# Patient Record
Sex: MALE | Race: ASIAN | NOT HISPANIC OR LATINO | URBAN - METROPOLITAN AREA
[De-identification: names, ages, dates, MRNs, and addresses within clinical notes are randomized per-mention and may not be internally consistent; named-entity substitution may affect disease eponyms.]

---

## 2022-07-05 ENCOUNTER — EMERGENCY (EMERGENCY)
Facility: HOSPITAL | Age: 57
LOS: 1 days | Discharge: HOME | End: 2022-07-05
Attending: EMERGENCY MEDICINE | Admitting: EMERGENCY MEDICINE

## 2022-07-05 VITALS
SYSTOLIC BLOOD PRESSURE: 102 MMHG | OXYGEN SATURATION: 100 % | RESPIRATION RATE: 18 BRPM | TEMPERATURE: 98 F | HEART RATE: 76 BPM | DIASTOLIC BLOOD PRESSURE: 52 MMHG

## 2022-07-05 VITALS
OXYGEN SATURATION: 100 % | RESPIRATION RATE: 18 BRPM | SYSTOLIC BLOOD PRESSURE: 100 MMHG | DIASTOLIC BLOOD PRESSURE: 60 MMHG | HEART RATE: 72 BPM | TEMPERATURE: 98 F

## 2022-07-05 LAB
ALBUMIN SERPL ELPH-MCNC: 3.9 G/DL — SIGNIFICANT CHANGE UP (ref 3.3–5)
ALP SERPL-CCNC: 56 U/L — SIGNIFICANT CHANGE UP (ref 40–120)
ALT FLD-CCNC: 21 U/L — SIGNIFICANT CHANGE UP (ref 4–41)
ANION GAP SERPL CALC-SCNC: 9 MMOL/L — SIGNIFICANT CHANGE UP (ref 7–14)
AST SERPL-CCNC: 19 U/L — SIGNIFICANT CHANGE UP (ref 4–40)
BASOPHILS # BLD AUTO: 0.01 K/UL — SIGNIFICANT CHANGE UP (ref 0–0.2)
BASOPHILS NFR BLD AUTO: 0.1 % — SIGNIFICANT CHANGE UP (ref 0–2)
BILIRUB SERPL-MCNC: 1.4 MG/DL — HIGH (ref 0.2–1.2)
BUN SERPL-MCNC: 14 MG/DL — SIGNIFICANT CHANGE UP (ref 7–23)
CALCIUM SERPL-MCNC: 9 MG/DL — SIGNIFICANT CHANGE UP (ref 8.4–10.5)
CHLORIDE SERPL-SCNC: 105 MMOL/L — SIGNIFICANT CHANGE UP (ref 98–107)
CO2 SERPL-SCNC: 24 MMOL/L — SIGNIFICANT CHANGE UP (ref 22–31)
CREAT SERPL-MCNC: 0.75 MG/DL — SIGNIFICANT CHANGE UP (ref 0.5–1.3)
EGFR: 104 ML/MIN/1.73M2 — SIGNIFICANT CHANGE UP
EOSINOPHIL # BLD AUTO: 0.02 K/UL — SIGNIFICANT CHANGE UP (ref 0–0.5)
EOSINOPHIL NFR BLD AUTO: 0.2 % — SIGNIFICANT CHANGE UP (ref 0–6)
GLUCOSE SERPL-MCNC: 115 MG/DL — HIGH (ref 70–99)
HCT VFR BLD CALC: 38.8 % — LOW (ref 39–50)
HGB BLD-MCNC: 13.6 G/DL — SIGNIFICANT CHANGE UP (ref 13–17)
IANC: 9.39 K/UL — HIGH (ref 1.8–7.4)
IMM GRANULOCYTES NFR BLD AUTO: 0.6 % — SIGNIFICANT CHANGE UP (ref 0–1.5)
LYMPHOCYTES # BLD AUTO: 0.3 K/UL — LOW (ref 1–3.3)
LYMPHOCYTES # BLD AUTO: 3 % — LOW (ref 13–44)
MAGNESIUM SERPL-MCNC: 1.7 MG/DL — SIGNIFICANT CHANGE UP (ref 1.6–2.6)
MCHC RBC-ENTMCNC: 31.1 PG — SIGNIFICANT CHANGE UP (ref 27–34)
MCHC RBC-ENTMCNC: 35.1 GM/DL — SIGNIFICANT CHANGE UP (ref 32–36)
MCV RBC AUTO: 88.6 FL — SIGNIFICANT CHANGE UP (ref 80–100)
MONOCYTES # BLD AUTO: 0.26 K/UL — SIGNIFICANT CHANGE UP (ref 0–0.9)
MONOCYTES NFR BLD AUTO: 2.6 % — SIGNIFICANT CHANGE UP (ref 2–14)
NEUTROPHILS # BLD AUTO: 9.39 K/UL — HIGH (ref 1.8–7.4)
NEUTROPHILS NFR BLD AUTO: 93.5 % — HIGH (ref 43–77)
NRBC # BLD: 0 /100 WBCS — SIGNIFICANT CHANGE UP
NRBC # FLD: 0 K/UL — SIGNIFICANT CHANGE UP
NT-PROBNP SERPL-SCNC: 51 PG/ML — SIGNIFICANT CHANGE UP
PLATELET # BLD AUTO: 119 K/UL — LOW (ref 150–400)
POTASSIUM SERPL-MCNC: 4.7 MMOL/L — SIGNIFICANT CHANGE UP (ref 3.5–5.3)
POTASSIUM SERPL-SCNC: 4.7 MMOL/L — SIGNIFICANT CHANGE UP (ref 3.5–5.3)
PROT SERPL-MCNC: 6.4 G/DL — SIGNIFICANT CHANGE UP (ref 6–8.3)
RBC # BLD: 4.38 M/UL — SIGNIFICANT CHANGE UP (ref 4.2–5.8)
RBC # FLD: 12.2 % — SIGNIFICANT CHANGE UP (ref 10.3–14.5)
SODIUM SERPL-SCNC: 138 MMOL/L — SIGNIFICANT CHANGE UP (ref 135–145)
TROPONIN T, HIGH SENSITIVITY RESULT: <6 NG/L — SIGNIFICANT CHANGE UP
WBC # BLD: 10.04 K/UL — SIGNIFICANT CHANGE UP (ref 3.8–10.5)
WBC # FLD AUTO: 10.04 K/UL — SIGNIFICANT CHANGE UP (ref 3.8–10.5)

## 2022-07-05 PROCEDURE — 12013 RPR F/E/E/N/L/M 2.6-5.0 CM: CPT

## 2022-07-05 PROCEDURE — 99285 EMERGENCY DEPT VISIT HI MDM: CPT | Mod: 25

## 2022-07-05 PROCEDURE — 93010 ELECTROCARDIOGRAM REPORT: CPT

## 2022-07-05 PROCEDURE — 71045 X-RAY EXAM CHEST 1 VIEW: CPT | Mod: 26

## 2022-07-05 RX ORDER — SODIUM CHLORIDE 9 MG/ML
1000 INJECTION INTRAMUSCULAR; INTRAVENOUS; SUBCUTANEOUS ONCE
Refills: 0 | Status: COMPLETED | OUTPATIENT
Start: 2022-07-05 | End: 2022-07-05

## 2022-07-05 RX ADMIN — SODIUM CHLORIDE 1000 MILLILITER(S): 9 INJECTION INTRAMUSCULAR; INTRAVENOUS; SUBCUTANEOUS at 14:29

## 2022-07-05 NOTE — ED PROVIDER NOTE - NS ED ROS FT
Constitutional: (-) fever + vomiting  Eyes/ENT: (-) vision changes, (-) hearing changes  Cardiovascular: (+) chest pain, (-) wheezing  Respiratory: (-) cough, (-) shortness of breath  Gastrointestinal: (-) vomiting, (-) diarrhea, (-) abdominal pain  : (-) dysuria   Musculoskeletal: (-) back pain  Integumentary: (-) rash, (-) edema  Neurological: (+)loc  Allergic/Immunologic: (-) pruritus  Endocrine: No history of thyroid disease

## 2022-07-05 NOTE — ED PROVIDER NOTE - OBJECTIVE STATEMENT
58 y/o M no pmh no daily meds here s/p syncope x2 with resolved CP. This AM was having a BM when he passed out, fell forward, cut eyelid with glasses. Went to urgent care and felt nauseated, cp, passed out again and was sent to Ed. No seizure activity incontinence or tongue biting. No fevers. Does have nausea and son reports that family has had gastro sx all weekend and believes pt could be dehydrated. Pt has not seen a cardiologist before. No SCRUGGS or exertional cp.

## 2022-07-05 NOTE — ED PROCEDURE NOTE - ATTENDING CONTRIBUTION TO CARE
Dr. Webb: I personally supervised this procedure performed by the resident and I agree with the above documentation.

## 2022-07-05 NOTE — ED PROVIDER NOTE - CLINICAL SUMMARY MEDICAL DECISION MAKING FREE TEXT BOX
Pt with syncope x2, 1st episode poss vasovagal while BM however second episode apparently unprovoked and a/w chest pain, nausea. Only other related sx is recent GI illness and decreased PO intake. Cardiac/arrythmia vs hypovolemia will need labs xr and lac repeair. Pt with syncope x2, 1st episode poss vasovagal while BM however second episode apparently unprovoked and a/w chest pain, nausea. Only other related sx is recent GI illness and decreased PO intake. Cardiac/arrythmia vs hypovolemia will need labs xr and lac repair.

## 2022-07-05 NOTE — ED PROVIDER NOTE - PHYSICAL EXAMINATION
Vitals: I have reviewed the patients vital signs  General: Well dressed, well appearing, no acute distress  HEENT: no signs of basilar skull injury  Eyes: EOMI, tracking appropriately  Neck: no tracheal deviation, no JVD  Chest/Lungs: no trauma, symmetric chest rise, speaking in complete sentences, no WOB  Heart: skin and extremities well perfused, regular rate and rhythm  Neuro: A+Ox3, ambulating without difficulty, CN grossly intact  MSK: strength at baseline in all extremities, no muscle wasting or atrophy  Skin: appx 2cm linear laceration along superior aspect of upper eyelid that is not full thickness  Abd: SNT no rebound or guarding.

## 2022-07-05 NOTE — ED PROVIDER NOTE - PROGRESS NOTE DETAILS
Aniket: patient re evaluated, 2nd episode of syncope was a/w episode of emesis and occurred during it, hx more c/w vasovagal syncope will re eval for dispo pending labs. Aniket: laceration repaired, patient well appearing, given return precautions, instructions to f/u for laceration.

## 2022-07-05 NOTE — ED PROVIDER NOTE - ATTENDING CONTRIBUTION TO CARE
Dr. Webb: 58 yo male with no sig PMH, in ED after 2 episodes of syncope.  First episode this morning while at home and having a bowel movement.  Pt did fall during this episode, his right eyelid and sustained laceration.  He went to urgent care and while there felt like he was going to vomit, as he went to vomit had another syncopal episode and was sent to ED.  Very brief LOC with quick return to baseline.  Of note, pt and son note that family members have had gastroenteritis symptoms for a few days, and pt may have same.  At the time of my eval pt noting no complaints.  On exam pt well appearing, in NAD, heart RRR, lungs CTAB, abd NTND, extremities without swelling, strength 5/5 in all extremities and skin without rash.  Right eyelid showing 2 cm linear laceration at border of eyelid and eyebrow.  Last tetanus vaccine 7 years ago.

## 2022-07-05 NOTE — ED PROCEDURE NOTE - PROCEDURE ADDITIONAL DETAILS
4 simple interrupted sutures placed to laceration above R eyelid, good approximation, hemostasis, tolerated without complication 4 simple interrupted sutures placed to laceration above R eyelid, good approximation, hemostasis, tolerated without complication    Pt and son advised that sutures must be removed in 5 days.

## 2022-07-05 NOTE — ED PROVIDER NOTE - PATIENT PORTAL LINK FT
You can access the FollowMyHealth Patient Portal offered by [facility] by registering at the following website: [email or web address]. By joining [email or web address]’s FollowMyHealth portal, you will also be able to view your health information using other applications (apps) compatible with our system.

## 2022-07-05 NOTE — ED PROVIDER NOTE - NSFOLLOWUPINSTRUCTIONS_ED_ALL_ED_FT
You were evaluated in the ER after passing out. We did not find an emergent medical problem. The results of your testing is attached below.     Follow up with your PCP within 1-2 weeks    Follow up to have your sutures removed in 5 days    Return to the ER for new chest pain, passing out, difficulty breathing, difficulty seeing, or for any concern you would like evaluated.

## 2022-07-05 NOTE — ED ADULT NURSE NOTE - CHIEF COMPLAINT QUOTE
pt coming from home.  pt was having BM on the toilet and syncopized.  when he syncopized his glasses cut his right eyebrow.  pt went to [facility] and syncopized again.  pt c/o dizziness

## 2022-07-05 NOTE — ED ADULT NURSE NOTE - OBJECTIVE STATEMENT
Sulma RN: Pt received to 1B. Awake and alert, A&OX4, ambulatory. Reporting 2 syncopal episodes today. First was while having a bowel movement at home. States he fell and hit his head. Unsure what he hit head on and unsure of LOC time. Denies AC use. 3cm laceration above L eye, below eyebrow. Second syncopal episode was at his PCP's office. States he felt like he was going to vomit, went to bathroom, and synopsized. Denies any injuries from this fall and unsure of LOC time. Denies vomiting. Respirations even and unlabored. Resting comfortably. Denies CP, SOB, N/V, HA, dizziness, palpitations, fatigue, HA, blurry vision. Received with 20g IV to LAC, +flush + blood return. Placed on CM, NSR. Report given to primary RN. Sulma RN: Pt received to 1B. Awake and alert, A&OX4, ambulatory. Reporting 2 syncopal episodes today. First was while having a bowel movement at home. States he fell and hit his head. Unsure what he hit head on and unsure of LOC time. Denies AC use. 3cm laceration above R eye, below eyebrow. Second syncopal episode was at his PCP's office. States he felt like he was going to vomit, went to bathroom, and synopsized. Denies any injuries from this fall and unsure of LOC time. Denies vomiting. Respirations even and unlabored. Resting comfortably. Denies CP, SOB, N/V, HA, dizziness, palpitations, fatigue, HA, blurry vision. Received with 20g IV to LAC, +flush + blood return. Placed on CM, NSR. Report given to primary RN.

## 2022-07-05 NOTE — ED ADULT TRIAGE NOTE - CHIEF COMPLAINT QUOTE
pt coming from home.  pt was having BM on the toilet and syncopized.  when he syncopized his glasses cut his right eyebrow.  pt went to [facility] and syncopized again.  pt c/o dizziness